# Patient Record
Sex: FEMALE | Race: WHITE | NOT HISPANIC OR LATINO | URBAN - METROPOLITAN AREA
[De-identification: names, ages, dates, MRNs, and addresses within clinical notes are randomized per-mention and may not be internally consistent; named-entity substitution may affect disease eponyms.]

---

## 2019-11-11 ENCOUNTER — EMERGENCY (EMERGENCY)
Facility: HOSPITAL | Age: 79
LOS: 1 days | Discharge: HOME | End: 2019-11-11
Attending: EMERGENCY MEDICINE | Admitting: EMERGENCY MEDICINE
Payer: MEDICARE

## 2019-11-11 VITALS
SYSTOLIC BLOOD PRESSURE: 162 MMHG | HEIGHT: 64 IN | HEART RATE: 81 BPM | TEMPERATURE: 98 F | OXYGEN SATURATION: 97 % | RESPIRATION RATE: 16 BRPM | DIASTOLIC BLOOD PRESSURE: 100 MMHG | WEIGHT: 125 LBS

## 2019-11-11 VITALS
RESPIRATION RATE: 16 BRPM | SYSTOLIC BLOOD PRESSURE: 145 MMHG | HEART RATE: 75 BPM | OXYGEN SATURATION: 97 % | DIASTOLIC BLOOD PRESSURE: 97 MMHG | TEMPERATURE: 97 F

## 2019-11-11 PROCEDURE — 25605 CLTX DST RDL FX/EPHYS SEP W/: CPT | Mod: RT

## 2019-11-11 PROCEDURE — 99285 EMERGENCY DEPT VISIT HI MDM: CPT | Mod: 25

## 2019-11-11 PROCEDURE — 73110 X-RAY EXAM OF WRIST: CPT | Mod: 26,RT

## 2019-11-11 PROCEDURE — 73110 X-RAY EXAM OF WRIST: CPT | Mod: 26,RT,77

## 2019-11-11 PROCEDURE — 99284 EMERGENCY DEPT VISIT MOD MDM: CPT

## 2019-11-11 PROCEDURE — 73110 X-RAY EXAM OF WRIST: CPT

## 2019-11-11 NOTE — ED PROVIDER NOTE - CARE PROVIDER_API CALL
Viraj West)  Orthopaedic Surgery  [address] [place] [address]  Phone: [phone]  Fax: [phone]  Follow Up Time:

## 2019-11-11 NOTE — ED PROVIDER NOTE - NSFOLLOWUPINSTRUCTIONS_ED_ALL_ED_FT
rest, elevate. tylenol for pain. keep wrist in splint. call orthopedic office tomorrow to be seen in office next 2-3 days

## 2019-11-11 NOTE — ED PROVIDER NOTE - PROGRESS NOTE DETAILS
Deanna STROUD for ED attending, Dr. Watkins : 80 y/o female fell off ladder today, c/o right wrist injury, denies other injury, no medical hx, no meds.  PE: obvious deformity of right wrist, distal ROM, pulses and sensation intact, remainder unremarkable   plan - XR, splint, ortho referral. Dr. Brett thapa. currently in surgery. Dr. West at bedside for reduction Reevaluated patient at bedside.  Patient feeling much improved. fx reduced by Dr West. splint applied by Dr. West. continues to decline pain meds. will follow up in their office 2-3 days.  Discussed the results of all diagnostic testing in ED and copies of all reports given.   An opportunity to ask questions was given.  Discussed the importance of prompt, close medical follow-up.  Patient will return with any changes, concerns or persistent / worsening symptoms.  Understanding of all instructions verbalized.

## 2019-11-11 NOTE — ED PROVIDER NOTE - CLINICAL SUMMARY MEDICAL DECISION MAKING FREE TEXT BOX
right wrist pain and swelling after fall PTA. will x-ray to eval for fx. denies hitting head. does not take any blood thinners. do not suspect skull fx or ICH. no vert tx to suggest vert fx

## 2019-11-11 NOTE — ED ADULT NURSE NOTE - NSIMPLEMENTINTERV_GEN_ALL_ED
Implemented All Fall Risk Interventions:  [place] to call system. Call bell, personal items and telephone within reach. Instruct patient to call for assistance. Room bathroom lighting operational. Non-slip footwear when patient is off stretcher. Physically safe environment: no spills, clutter or unnecessary equipment. Stretcher in lowest position, wheels locked, appropriate side rails in place. Provide visual cue, wrist band, yellow gown, etc. Monitor gait and stability. Monitor for mental status changes and reorient to person, place, and time. Review medications for side effects contributing to fall risk. Reinforce activity limits and safety measures with patient and family.

## 2019-11-11 NOTE — ED PROVIDER NOTE - OBJECTIVE STATEMENT
otherwise healthy 79 year old female presents with pain and swelling to right wrist. was trying to clean leaves out of gutter. was on ladder and ladder fell (about 4 feet high per patient) otherwise healthy 79 year old female presents with pain and swelling to right wrist. was trying to clean leaves out of gutter. was on ladder and ladder fell (about 4 feet high per patient). landed on right wrist. pain mild in nature. no n/t. denies hitting head or LOC. does not take any blood thinners. no neck or back pain. left handed. has not taken anything for pain or symptoms. denies other injuries   no PCP

## 2019-11-11 NOTE — ED PROVIDER NOTE - PATIENT PORTAL LINK FT
You can access the FollowMyHealth Patient Portal offered by [facility] by registering at the following website: [email or web address]. By joining [facility]’s FollowMyHealth portal, you will also be able to view your health information using other applications (apps) compatible with our system.

## 2019-11-11 NOTE — ED PROVIDER NOTE - NEUROLOGICAL, MLM
Alert and oriented, no focal deficits, no motor or sensory deficits. strong distal pulses. cap refill < 2 sec. no wrist drop

## 2024-12-10 ENCOUNTER — OFFICE (OUTPATIENT)
Dept: URBAN - METROPOLITAN AREA CLINIC 27 | Facility: CLINIC | Age: 84
Setting detail: OPHTHALMOLOGY
End: 2024-12-10
Payer: MEDICARE

## 2024-12-10 DIAGNOSIS — H40.1134: ICD-10-CM

## 2024-12-10 DIAGNOSIS — H25.13: ICD-10-CM

## 2024-12-10 DIAGNOSIS — H52.4: ICD-10-CM

## 2024-12-10 PROCEDURE — 92004 COMPRE OPH EXAM NEW PT 1/>: CPT | Performed by: OPHTHALMOLOGY

## 2024-12-10 PROCEDURE — 92250 FUNDUS PHOTOGRAPHY W/I&R: CPT | Performed by: OPHTHALMOLOGY

## 2024-12-10 PROCEDURE — 76514 ECHO EXAM OF EYE THICKNESS: CPT | Performed by: OPHTHALMOLOGY

## 2024-12-10 PROCEDURE — 92015 DETERMINE REFRACTIVE STATE: CPT | Performed by: OPHTHALMOLOGY

## 2024-12-10 ASSESSMENT — PACHYMETRY
OD_CT_UM: 558
OS_CT_CORRECTION: -1
OS_CT_UM: 550
OD_CT_CORRECTION: -1

## 2024-12-10 ASSESSMENT — REFRACTION_CURRENTRX
OD_OVR_VA: 20/
OD_AXIS: 006
OS_CYLINDER: +1.00
OS_SPHERE: +2.25
OD_CYLINDER: +0.75
OS_OVR_VA: 20/
OD_SPHERE: +3.00
OS_AXIS: 15

## 2024-12-10 ASSESSMENT — TONOMETRY
OD_IOP_MMHG: 21
OD_IOP_MMHG: 19

## 2024-12-10 ASSESSMENT — REFRACTION_MANIFEST
OD_AXIS: 20
OS_ADD: +2.50
OD_VA1: 20/20
OS_SPHERE: +0.25
OD_SPHERE: +1.50
OD_CYLINDER: +2.00
OS_AXIS: 180
OD_ADD: +2.50
OS_VA1: 20/20
OS_CYLINDER: +1.75

## 2024-12-10 ASSESSMENT — KERATOMETRY
OD_K2POWER_DIOPTERS: 45.25
OD_K1POWER_DIOPTERS: 43.75
OD_AXISANGLE_DEGREES: 18
OS_AXISANGLE_DEGREES: 001
OS_K2POWER_DIOPTERS: 45.25
METHOD_AUTO_MANUAL: AUTO
OS_K1POWER_DIOPTERS: 44.00

## 2024-12-10 ASSESSMENT — REFRACTION_AUTOREFRACTION
OD_AXIS: 11
OS_AXIS: 009
OS_SPHERE: 0.00
OS_CYLINDER: +1.75
OD_CYLINDER: +2.00
OD_SPHERE: +1.25

## 2024-12-10 ASSESSMENT — VISUAL ACUITY
OS_BCVA: 20/50
OD_BCVA: 20/30-1

## 2024-12-10 ASSESSMENT — CONFRONTATIONAL VISUAL FIELD TEST (CVF)
OD_FINDINGS: FULL
OS_FINDINGS: FULL

## 2025-01-14 ENCOUNTER — OFFICE (OUTPATIENT)
Dept: URBAN - METROPOLITAN AREA CLINIC 27 | Facility: CLINIC | Age: 85
Setting detail: OPHTHALMOLOGY
End: 2025-01-14
Payer: MEDICARE

## 2025-01-14 ENCOUNTER — RX ONLY (RX ONLY)
Age: 85
End: 2025-01-14

## 2025-01-14 DIAGNOSIS — H25.13: ICD-10-CM

## 2025-01-14 DIAGNOSIS — H40.1122: ICD-10-CM

## 2025-01-14 DIAGNOSIS — H40.1111: ICD-10-CM

## 2025-01-14 PROCEDURE — 92133 CPTRZD OPH DX IMG PST SGM ON: CPT | Performed by: OPHTHALMOLOGY

## 2025-01-14 PROCEDURE — 92083 EXTENDED VISUAL FIELD XM: CPT | Performed by: OPHTHALMOLOGY

## 2025-01-14 PROCEDURE — 99213 OFFICE O/P EST LOW 20 MIN: CPT | Performed by: OPHTHALMOLOGY

## 2025-01-14 ASSESSMENT — CONFRONTATIONAL VISUAL FIELD TEST (CVF)
OS_FINDINGS: FULL
OD_FINDINGS: FULL

## 2025-01-14 ASSESSMENT — REFRACTION_AUTOREFRACTION
OD_CYLINDER: +1.50
OD_AXIS: 180
OS_SPHERE: -0.50
OS_AXIS: 013
OS_CYLINDER: +2.00
OD_SPHERE: +1.50

## 2025-01-14 ASSESSMENT — REFRACTION_MANIFEST
OS_AXIS: 180
OD_ADD: +2.50
OS_SPHERE: +0.25
OS_ADD: +2.50
OS_CYLINDER: +1.75
OD_VA1: 20/20
OD_SPHERE: +1.50
OD_AXIS: 20
OD_CYLINDER: +2.00
OS_VA1: 20/20

## 2025-01-14 ASSESSMENT — VISUAL ACUITY
OD_BCVA: 20/50+2
OS_BCVA: 20/60-2

## 2025-01-14 ASSESSMENT — TONOMETRY
OS_IOP_MMHG: 18
OS_IOP_MMHG: 16
OD_IOP_MMHG: 15
OD_IOP_MMHG: 17

## 2025-01-14 ASSESSMENT — PACHYMETRY
OD_CT_UM: 558
OS_CT_UM: 550
OS_CT_CORRECTION: -1
OD_CT_CORRECTION: -1

## 2025-01-14 ASSESSMENT — KERATOMETRY
OS_AXISANGLE_DEGREES: 002
OS_K2POWER_DIOPTERS: 45.75
METHOD_AUTO_MANUAL: AUTO
OD_K2POWER_DIOPTERS: 45.25
OD_AXISANGLE_DEGREES: 011
OD_K1POWER_DIOPTERS: 43.75
OS_K1POWER_DIOPTERS: 44.00

## 2025-01-14 ASSESSMENT — REFRACTION_CURRENTRX
OS_CYLINDER: +1.00
OD_AXIS: 006
OS_OVR_VA: 20/
OS_SPHERE: +2.25
OD_SPHERE: +3.00
OD_CYLINDER: +0.75
OD_OVR_VA: 20/
OS_AXIS: 15

## 2025-05-13 ENCOUNTER — OFFICE (OUTPATIENT)
Dept: URBAN - METROPOLITAN AREA CLINIC 27 | Facility: CLINIC | Age: 85
Setting detail: OPHTHALMOLOGY
End: 2025-05-13
Payer: MEDICARE

## 2025-05-13 DIAGNOSIS — H25.13: ICD-10-CM

## 2025-05-13 DIAGNOSIS — H40.033: ICD-10-CM

## 2025-05-13 PROBLEM — H40.2232 CHRONIC ANGLE CLOSURE GLAUCOMA; BOTH EYES MODERATE: Status: ACTIVE | Noted: 2025-05-13

## 2025-05-13 PROCEDURE — 99213 OFFICE O/P EST LOW 20 MIN: CPT | Performed by: OPHTHALMOLOGY

## 2025-05-13 PROCEDURE — 92133 CPTRZD OPH DX IMG PST SGM ON: CPT | Performed by: OPHTHALMOLOGY

## 2025-05-13 PROCEDURE — 92020 GONIOSCOPY: CPT | Performed by: OPHTHALMOLOGY

## 2025-05-13 PROCEDURE — 92083 EXTENDED VISUAL FIELD XM: CPT | Performed by: OPHTHALMOLOGY

## 2025-05-13 ASSESSMENT — REFRACTION_CURRENTRX
OS_OVR_VA: 20/
OS_CYLINDER: +1.00
OS_SPHERE: +2.25
OD_OVR_VA: 20/
OD_SPHERE: +3.00
OD_AXIS: 006
OD_CYLINDER: +0.75
OS_AXIS: 15

## 2025-05-13 ASSESSMENT — REFRACTION_MANIFEST
OD_VA1: 20/20
OS_VA1: 20/20
OD_AXIS: 20
OD_SPHERE: +1.50
OD_ADD: +2.50
OD_CYLINDER: +2.00
OS_AXIS: 180
OS_ADD: +2.50
OS_CYLINDER: +1.75
OS_SPHERE: +0.25

## 2025-05-13 ASSESSMENT — TONOMETRY
OD_IOP_MMHG: 21
OD_IOP_MMHG: 17

## 2025-05-13 ASSESSMENT — VISUAL ACUITY
OD_BCVA: 20/25
OS_BCVA: 20/30-1

## 2025-05-13 ASSESSMENT — REFRACTION_AUTOREFRACTION
OD_CYLINDER: +2.00
OD_SPHERE: +1.25
OS_CYLINDER: +2.00
OD_AXIS: 006
OS_AXIS: 006
OS_SPHERE: -0.25

## 2025-05-13 ASSESSMENT — KERATOMETRY
OS_K1POWER_DIOPTERS: 43.75
OS_AXISANGLE_DEGREES: 178
OD_K1POWER_DIOPTERS: 43.50
OD_K2POWER_DIOPTERS: 45.25
OS_K2POWER_DIOPTERS: 45.25
OD_AXISANGLE_DEGREES: 16
METHOD_AUTO_MANUAL: AUTO

## 2025-05-13 ASSESSMENT — PACHYMETRY
OS_CT_CORRECTION: -1
OS_CT_UM: 550
OD_CT_UM: 558
OD_CT_CORRECTION: -1